# Patient Record
(demographics unavailable — no encounter records)

---

## 2017-10-07 NOTE — RAD
CHEST 1 VIEW:

 

History 

Chest pain.

 

COMPARISON: 

2/3/17.

 

FINDINGS: 

The cardiac silhouette is magnified by projection.  Pulmonary vasculature is upper limits of normal.
  Mediastinum is midline.  There is no lobar consolidation or evidence of pneumothorax.

 

IMPRESSION: 

No active cardiopulmonary abnormalities are demonstrated.

 

POS: [facility]

## 2017-10-07 NOTE — CT
CT ABDOMEN AND PELVIS WITH IV CONTRAST:

10/7/17

 

HISTORY: 

Abdominal pain and distention with black stools.

 

FINDINGS:  

Comparison is made with exam of 1/28/05. 

 

There are mild dependent changes in the lung bases. There is a small focal hyperenhancing lesion in 
the peripheral aspect of the right lobe of the liver which appears smaller compared to the previous 
study and is likely due to imaging in difference phase of contrast administration. Lower density les
ion in the inferior aspect of the peripheral right lower lobe is essentially stable. A few tiny low 
density lesions are seen close to the dome. This was not definitely identified on the previous study
 but likely benign. No calcified gallstones are identified. The spleen and adrenal glands are normal
. There is a 1.1 cm low density lesion in the body of the pancreas which is slightly larger than on 
the previous study; however, on the current study it has attenuation values of fat and likely repres
ents a benign lipoma. Cortical scarring in the posterior aspect of the mid cortex of the right kidne
y is again seen. There is low density lesions in the kidneys likely cysts. 

 

No free air, free fluid or lymphadenopathy is noted in the abdomen or pelvis. There is no evidence o
f aneurysmal dilatation of the abdominal aorta. No free air or lymphadenopathy seen in the abdomen o
r pelvis. The patient is post hysterectomy. A small amount of free fluid is noted in the pelvis. The
re is fluid in the mid and distal small bowel loops and the proximal colon. Fecal material is noted 
in the colon. There is prominence of the wall of the stomach likely due to absence of distention. 
r fluid levels are noted in the stomach and duodenum. There is no evidence of aneurysmal dilatation 
of the abdominal aorta. The abdominal aorta is well opacified without intimal flap to suggest dissec
tion. There are mild degenerative changes in the spine. 

 

IMPRESSION:  

1.      Bowel findings may represent enteritis. Given the history of black stools, further evaluatio
n with colonoscopy is recommended.

2.      Small amount of free fluid in the pelvis. 

3.      Benign appearing lesions in the liver, spleen and pancreas. 

 

POS: [facility]

## 2017-10-10 NOTE — EKG
Test Reason : 

Blood Pressure : ***/*** mmHG

Vent. Rate : 058 BPM     Atrial Rate : 058 BPM

   P-R Int : 142 ms          QRS Dur : 072 ms

    QT Int : 458 ms       P-R-T Axes : 073 021 077 degrees

   QTc Int : 449 ms

 

Sinus bradycardia

Otherwise normal ECG

 

Confirmed by ALEXEY RUSH, BEKAH ([id number]),  ADAMS STRICKLAND ([id number]) on 10/10/2017 6:42:06 AM

 

Referred By:             Confirmed By:BEKAH BLACKBURN MD